# Patient Record
Sex: MALE | Race: WHITE | NOT HISPANIC OR LATINO | URBAN - METROPOLITAN AREA
[De-identification: names, ages, dates, MRNs, and addresses within clinical notes are randomized per-mention and may not be internally consistent; named-entity substitution may affect disease eponyms.]

---

## 2022-01-19 ENCOUNTER — TELEPHONE (OUTPATIENT)
Dept: NEUROLOGY | Facility: CLINIC | Age: 52
End: 2022-01-19
Payer: OTHER GOVERNMENT

## 2022-01-19 NOTE — TELEPHONE ENCOUNTER
----- Message from Alena Townsend sent at 1/19/2022  1:16 PM CST -----  Regarding: appt  Hi,      [facility] Dr[facility] Marylu Bassett. Patient dx myoclonus. Referral and records are saved into media manager/[employer].     Can you please review and contact patient for scheduling?     Thank you,   Alena Todd

## 2022-01-19 NOTE — TELEPHONE ENCOUNTER
Spoke to patient spouse appointment placed on hold. Appointment will show up one week before the appointment date and time.

## 2022-02-08 ENCOUNTER — TELEPHONE (OUTPATIENT)
Dept: NEUROLOGY | Facility: CLINIC | Age: 52
End: 2022-02-08
Payer: OTHER GOVERNMENT

## 2022-02-08 NOTE — TELEPHONE ENCOUNTER
Spoke with patients wife and advised patient is being referred to Dr. Huerta for myoclonus. Dr. Bassett does not see this. Patient will need to see movement disorder. Patients wife voiced understanding.

## 2022-02-08 NOTE — TELEPHONE ENCOUNTER
----- Message from Sasha Taylor sent at 2/8/2022  4:39 PM CST -----  Contact: Shantelle Solo-wife  Calling in regards to getting an apt scheduled. Pt has a referral. Please call [phone]

## 2022-02-08 NOTE — TELEPHONE ENCOUNTER
----- Message from Kailee Bassett sent at 2/8/2022  4:56 PM CST -----  Regarding: Appointment Confimation  Contact: Pt [phone]  Patient is calling NATALIYA Bergeron's office in regards to confirming appt 02/15/2022 at 10:15 am. Please call. [phone]

## 2022-02-09 ENCOUNTER — TELEPHONE (OUTPATIENT)
Dept: NEUROLOGY | Facility: CLINIC | Age: 52
End: 2022-02-09
Payer: OTHER GOVERNMENT

## 2022-02-09 NOTE — TELEPHONE ENCOUNTER
----- Message from Meme Brown sent at 2/8/2022  2:02 PM CST -----  Contact: Wife  Type: Patient Call Back         Who called: Wife/ [facility]         What is the request in detail: states patient and wife a re currently at the facility; state she had adis ppt sched for today and they are having trouble getting in touched with the office; no appts are showing for today; please advise           Best call back number: [phone] - Shantelle         Additional Information:            Thank You

## 2022-02-15 ENCOUNTER — OFFICE VISIT (OUTPATIENT)
Dept: NEUROLOGY | Facility: CLINIC | Age: 52
End: 2022-02-15
Payer: OTHER GOVERNMENT

## 2022-02-15 VITALS — WEIGHT: 220 LBS | BODY MASS INDEX: 31.5 KG/M2 | HEIGHT: 70 IN

## 2022-02-15 DIAGNOSIS — R25.9 ABNORMAL INVOLUNTARY MOVEMENTS: ICD-10-CM

## 2022-02-15 DIAGNOSIS — G25.9 FUNCTIONAL MOVEMENT DISORDER: Primary | ICD-10-CM

## 2022-02-15 PROCEDURE — 99214 OFFICE O/P EST MOD 30 MIN: CPT | Mod: PBBFAC | Performed by: PHYSICIAN ASSISTANT

## 2022-02-15 PROCEDURE — 99205 OFFICE O/P NEW HI 60 MIN: CPT | Mod: S$PBB,,, | Performed by: PHYSICIAN ASSISTANT

## 2022-02-15 PROCEDURE — 99999 PR PBB SHADOW E&M-EST. PATIENT-LVL IV: CPT | Mod: PBBFAC,,, | Performed by: PHYSICIAN ASSISTANT

## 2022-02-15 PROCEDURE — 99999 PR PBB SHADOW E&M-EST. PATIENT-LVL IV: ICD-10-PCS | Mod: PBBFAC,,, | Performed by: PHYSICIAN ASSISTANT

## 2022-02-15 PROCEDURE — 99205 PR OFFICE/OUTPT VISIT, NEW, LEVL V, 60-74 MIN: ICD-10-PCS | Mod: S$PBB,,, | Performed by: PHYSICIAN ASSISTANT

## 2022-02-15 RX ORDER — METHOCARBAMOL 750 MG/1
750 TABLET, FILM COATED ORAL 2 TIMES DAILY
COMMUNITY
Start: 2021-08-31

## 2022-02-15 RX ORDER — MICONAZOLE NITRATE 20 MG/ML
TINCTURE TOPICAL
COMMUNITY
Start: 2021-10-06

## 2022-02-15 RX ORDER — HYDROCODONE BITARTRATE AND ACETAMINOPHEN 10; 325 MG/1; MG/1
1 TABLET ORAL DAILY PRN
COMMUNITY

## 2022-02-15 RX ORDER — ZONISAMIDE 100 MG/1
CAPSULE ORAL
COMMUNITY
Start: 2022-01-24 | End: 2023-01-25

## 2022-02-15 RX ORDER — CHOLECALCIFEROL (VITAMIN D3) 25 MCG
TABLET ORAL
COMMUNITY
Start: 2021-10-22

## 2022-02-15 RX ORDER — ESCITALOPRAM OXALATE 20 MG/1
1 TABLET ORAL DAILY
COMMUNITY
Start: 2022-01-02 | End: 2023-01-03

## 2022-02-15 RX ORDER — MELOXICAM 15 MG/1
15 TABLET ORAL
COMMUNITY

## 2022-02-15 RX ORDER — CYCLOBENZAPRINE HCL 10 MG
10 TABLET ORAL NIGHTLY
COMMUNITY
Start: 2021-09-28

## 2022-02-15 RX ORDER — DIAZEPAM 5 MG/1
TABLET ORAL
COMMUNITY
Start: 2021-10-14 | End: 2023-10-14

## 2022-02-15 RX ORDER — TRAZODONE HYDROCHLORIDE 100 MG/1
TABLET ORAL
COMMUNITY
Start: 2022-01-02 | End: 2023-01-03

## 2022-02-15 RX ORDER — ATORVASTATIN CALCIUM 40 MG/1
TABLET, FILM COATED ORAL
COMMUNITY
Start: 2021-10-22

## 2022-02-15 RX ORDER — IBUPROFEN 800 MG/1
800 TABLET ORAL
COMMUNITY

## 2022-02-15 RX ORDER — LEVETIRACETAM 500 MG/1
1000 TABLET ORAL
COMMUNITY
Start: 2022-01-03 | End: 2022-07-02

## 2022-02-15 RX ORDER — CLONAZEPAM 1 MG/1
1 TABLET ORAL 3 TIMES DAILY PRN
COMMUNITY
Start: 2021-11-09

## 2022-02-15 RX ORDER — DIPHENHYDRAMINE HYDROCHLORIDE 25 MG/1
CAPSULE ORAL
COMMUNITY
Start: 2021-09-07

## 2022-02-15 RX ORDER — CELECOXIB 200 MG/1
CAPSULE ORAL 2 TIMES DAILY
COMMUNITY
Start: 2021-10-22

## 2022-02-15 NOTE — PROGRESS NOTES
Name: Ellis Adams  MRN: [id number]   CSN: [id number]      Date: 02/15/2022    Referring physician:  Aaareftara Self  No address on file    Chief Complaint: abnormal involuntary movements     History of Present Illness (HPI): Ellis Adams is a L HANDED 51 y.o. male with a medical issues significant for who presents for abnormal involuntary movements. Accompanied by spouse. Sept 5th 2021 everything was normal, they were out of town and came back home Sunday. He has had chronic back pain, 2 fusions (2003, a long time ago). On Sept 4th he was completely normal, drove 3 hours, talked normal and walked normal. He had covid vaccine 3 weeks prior to whenever these movements started happening. [facility] -- said his [facility] was hurting him, sent him gabapentin the mail. Took one dose of gabapentin 600 mg, within 10 minutes, started twitching -- small twitches. He wasn't himself. Wife gave him one of his pain pills. Maybe 4 twitches an hour. Labor day (also took one dose of gabapentin) he was still twitching a little bit. Went to work on Tuesday and was twitching worse. Went to the ER, gave him benadryl and told him they thought it was a reaction to gabapentin. Wife brought him home and he was speaking completely normal. Saw neurologist Kerrie Lyon -- extensive work up, everything came back normal. 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 This has still progressively gotten worse everyday. Abnormal speech was abrupt onset as well.   Had brain MRI, EEG, everything came back normal. Wife feels that this has affected his memory as well. If he stays busy, it gets better. If he plays a game on his phone, it makes it better.   Has imbalance, some falls but no injuries. Goes down slowly to the ground. 1-2 falls a month. No major injuries, slowly to the ground.  Started grunting especially with pain in his back. Movements are always there unless he is distracted but intense movements with pain.   Denies  vision changes, bowel or bladder issues.   Failed keppra and valium, neither helped. Now on zonisamide, not helping.   [facility] Not currently doing PT.   Movements worsened with keppra.     Family History: none     Neuroleptic Exposure: none       Review of Systems:   Review of Systems   Constitutional: Negative for chills, fever and malaise/fatigue.   HENT: Negative for hearing loss.    Eyes: Negative for blurred vision and double vision.   Respiratory: Negative for cough, shortness of breath and stridor.    Cardiovascular: Negative for chest pain and leg swelling.   Gastrointestinal: Negative for constipation, diarrhea and nausea.   Genitourinary: Negative for frequency and urgency.   Skin: Negative for itching and rash.   Neurological: Positive for tremors and speech change. Negative for dizziness, loss of consciousness and weakness.   Psychiatric/Behavioral: Positive for memory loss. Negative for hallucinations.           Past Medical History: The patient  has no past medical history on file.    Social History: The patient  reports that he has never smoked. He has never used smokeless tobacco. He reports previous alcohol use. He reports that he does not use drugs.    Family History: Their family history is not on file.    Allergies: Gabapentin and Methocarbamol     Meds:   Current Outpatient Medications on File Prior to Visit   Medication Sig Dispense Refill    atorvastatin (LIPITOR) 40 MG tablet Take by mouth.      AZOLEN TINCTURE 2 % Tinc Apply topically.      BANOPHEN 25 mg capsule Take by mouth.      celecoxib (CELEBREX) 200 MG capsule Take by mouth 2 (two) times daily.      clonazePAM (KLONOPIN) 1 MG tablet Take 1 mg by mouth 3 (three) times daily as needed.      cyclobenzaprine (FLEXERIL) 10 MG tablet Take 10 mg by mouth nightly.      diazePAM (VALIUM) 5 MG tablet   1-2 tab, Oral, HS, # 50 tab, 0 Refill(s), Acute, Pharmacy: [facility]/pharmacy #[id number] Temp Closure, 180, cm,  "10/14/21 14:46:00 CDT, Height/Length Measured, 101.3, kg, 10/14/21 14:46:00 CDT, Weight Dosing      EScitalopram oxalate (LEXAPRO) 20 MG tablet Take 1 tablet by mouth once daily.      HYDROcodone-acetaminophen (NORCO)  mg per tablet Take 1 tablet by mouth daily as needed.      ibuprofen (ADVIL,MOTRIN) 800 MG tablet Take 800 mg by mouth.      levETIRAcetam (KEPPRA) 500 MG Tab 1,000 mg.      meloxicam (MOBIC) 15 MG tablet Take 15 mg by mouth.      methocarbamoL (ROBAXIN) 750 MG Tab Take 750 mg by mouth 2 (two) times daily.      traZODone (DESYREL) 100 MG tablet TAKE ONE TABLET BY MOUTH AT BEDTIME AS NEEDED FOR DEPRESSION      vitamin D (VITAMIN D3) 1000 units Tab       zonisamide (ZONEGRAN) 100 MG Cap TAKE ONE CAPSULE BY MOUTH EVERY DAY AS INSTRUCTED BY NEUROLOGY       No current facility-administered medications on file prior to visit.       Exam:  Ht 5' 10" (1.778 m)   Wt 99.8 kg (220 lb)   BMI 31.57 kg/m²     Constitutional  Well-developed, well-nourished, appears stated age   Ophthalmoscopic  No papilledema with no hemorrhages or exudates bilaterally   Cardiovascular  Radial pulses 2+ and symmetric, no LE edema bilaterally   Neurological    * Mental status  MOCA =      - Orientation  Oriented to person, place, time, and situation     - Memory   Intact recent and remote     - Attention/concentration  Attentive, vigilant during exam     - Language  Naming & repetition intact, +2-step commands     - Fund of knowledge  Aware of current events     - Executive  Well-organized thoughts     - Other     * Cranial nerves       - CN II  PERRL, visual fields full to confrontation     - CN III, IV, VI  Extraocular movements full, normal pursuits and saccades     - CN V  Sensation V1 - V3 intact     - CN VII  Face strong and symmetric bilaterally     - CN VIII  Hearing intact bilaterally     - CN IX, X  Palate raises midline and symmetric     - CN XI  SCM and trapezius 5/5 bilaterally     - CN XII  Tongue " midline   * Motor  Muscle bulk normal, strength 5/5 throughout   * Sensory   Intact to temperature and vibration throughout   * Coordination  No dysmetria with finger-to-nose or heel-to-shin   * Gait  See below.   * Deep tendon reflexes  2+ and symmetric throughout   Babinski downgoing bilaterally   * Specialized movement exam  No hypophonic speech, abnormal prosody and intonation    No facial masking.   No cogwheel rigidity.     entrainment noted on exam    movements vary in amplitude, frequency and there is axis change throughout    astasia abasia, wide-based gait -- some inversion of R foot during gait    chaotic head tremor -- varies between yes-yes and no-no         Laboratory/Radiological:  - Results:  No visits with results within 3 Month(s) from this visit.   Latest known visit with results is:   No results found for any previous visit.       - Independent review of images:      - Independent review of consultant's notes: Camron    ASSESSMENT/PLAN:  1. Abnormal involuntary movements   - presentation favoring functional movement disorder   - likely initially started as true myoclonus (wife describes as very fast twitches at first) as a reaction from gabapentin (only took two doses), movements continued to worsen (despite no longer taking gabapentin) movements increased in amplitude (some ballistic movements)   - abrupt abnormal speech pattern   - patient and family report extensive work up done by neurologist Dr. Kerrie Lyon -- normal EEG, normal MRI  - wife plans to get disc of MRI so I can review it   - movements worse with pain, improved whenever he is distracted doing something (playing video game, remote cars)  - extensive discussion regarding the importance of pain management and physical therapy for functional movement disorders.   - taper off zonegran -- no benefit to movements         Orders Placed This Encounter    Ambulatory referral/consult to Physical/Occupational Therapy           Follow up: in  3-4 months with [facility]     Collaborating Physician, Dr. Huerta, was available during today's encounter. Any change to plan along with cosign to appear in the EMR.       Total time spent with the patient: 65 minutes.  50 minutes of face-to-face consultation and 15 minutes of chart review and coordination of care, on the day of the visit. This includes face to face time and non-face to face time preparing to see the patient (eg, review of tests), obtaining and/or reviewing separately obtained history, documenting clinical information in the electronic or other health record, independently interpreting resultsand communicating results to the patient/family/caregiver, or care coordination.         Kiesha Bergeron PA-C   [facility]  Department of Neurology  Movement Disorders

## 2022-03-02 ENCOUNTER — TELEPHONE (OUTPATIENT)
Dept: NEUROLOGY | Facility: CLINIC | Age: 52
End: 2022-03-02
Payer: OTHER GOVERNMENT

## 2022-03-02 NOTE — TELEPHONE ENCOUNTER
----- Message from Joy Ortega sent at 3/2/2022  1:35 PM CST -----  Regarding: TIAN Pringle is calling to speak with the nurse in ref to the pt they are needing the pts office visit notes from his last appt can you please call Yary at [phone]. To please fax the office notes to Team 4 [phone]    AMILCAR